# Patient Record
Sex: MALE | Race: WHITE | NOT HISPANIC OR LATINO
[De-identification: names, ages, dates, MRNs, and addresses within clinical notes are randomized per-mention and may not be internally consistent; named-entity substitution may affect disease eponyms.]

---

## 2022-04-08 ENCOUNTER — APPOINTMENT (OUTPATIENT)
Dept: ORTHOPEDIC SURGERY | Facility: CLINIC | Age: 82
End: 2022-04-08
Payer: MEDICARE

## 2022-04-08 PROCEDURE — 99204 OFFICE O/P NEW MOD 45 MIN: CPT

## 2022-04-08 NOTE — HISTORY OF PRESENT ILLNESS
[1] : 2 [Constant] : constant [Meds] : meds [Heat] : heat [Walking] : walking [Retired] : Work status: retired [de-identified] : Pt reports pain and weakness in left thigh- worse first thing in the morning, x~2 weeks with NKI\par Reports pain improves throughout the day\par Pt had Xrays at [facility] per PCP Dr Preciado \par Pt has rx for PT- states he has not started yet  [] : no [FreeTextEntry9] : Tylenol, Eliecer Pas

## 2022-04-08 NOTE — PHYSICAL EXAM
[Outside films reviewed] : Outside films reviewed [] : clonus not sustained at ankle [TWNoteComboBox7] : forward flexion 60 degrees [de-identified] : extension 10 degrees [FreeTextEntry1] : multilevel lumbar spondylosis, scoliosis, Spondylolisthesis L4-5

## 2022-04-08 NOTE — ASSESSMENT
[FreeTextEntry1] : Recommend: - NSAID - Heating pad - Muscle relaxer - Core strengthening exercise - Hamstring stretching exercise Patient is given back rehabilitation exercise book. \par \par Begin PT\par \par Follow up in 2 months

## 2022-06-23 ENCOUNTER — APPOINTMENT (OUTPATIENT)
Dept: ORTHOPEDIC SURGERY | Facility: CLINIC | Age: 82
End: 2022-06-23
Payer: MEDICARE

## 2022-06-23 VITALS — WEIGHT: 236 LBS | BODY MASS INDEX: 33.79 KG/M2 | HEIGHT: 70 IN

## 2022-06-23 DIAGNOSIS — M54.50 LOW BACK PAIN, UNSPECIFIED: ICD-10-CM

## 2022-06-23 DIAGNOSIS — M54.31 SCIATICA, RIGHT SIDE: ICD-10-CM

## 2022-06-23 PROCEDURE — 99214 OFFICE O/P EST MOD 30 MIN: CPT

## 2022-06-23 RX ORDER — METRONIDAZOLE 250 MG/1
250 TABLET ORAL
Qty: 10 | Refills: 0 | Status: ACTIVE | COMMUNITY
Start: 2022-03-18

## 2022-06-23 RX ORDER — TORSEMIDE 20 MG/1
20 TABLET ORAL
Qty: 270 | Refills: 0 | Status: ACTIVE | COMMUNITY
Start: 2022-02-25

## 2022-06-23 RX ORDER — CLOPIDOGREL BISULFATE 75 MG/1
75 TABLET, FILM COATED ORAL
Qty: 90 | Refills: 0 | Status: ACTIVE | COMMUNITY
Start: 2022-02-03

## 2022-06-23 RX ORDER — POTASSIUM CHLORIDE 1500 MG/1
20 TABLET, EXTENDED RELEASE ORAL
Qty: 270 | Refills: 0 | Status: ACTIVE | COMMUNITY
Start: 2022-06-14

## 2022-06-23 RX ORDER — CAMPHOR, MENTHOL, METHYL SALICYLATE 86.24; 166.92; 278.2 MG/1; MG/1; MG/1
3.1-6-1 PATCH PERCUTANEOUS; TOPICAL; TRANSDERMAL
Qty: 12 | Refills: 0 | Status: ACTIVE | COMMUNITY
Start: 2022-04-13

## 2022-06-23 RX ORDER — POLYETHYLENE GLYCOL 3350 17 G/17G
17 POWDER, FOR SOLUTION ORAL
Qty: 510 | Refills: 0 | Status: ACTIVE | COMMUNITY
Start: 2022-02-24

## 2022-06-23 RX ORDER — METOPROLOL TARTRATE 25 MG/1
25 TABLET, FILM COATED ORAL
Qty: 180 | Refills: 0 | Status: ACTIVE | COMMUNITY
Start: 2021-09-14

## 2022-06-23 RX ORDER — ATORVASTATIN CALCIUM 20 MG/1
20 TABLET, FILM COATED ORAL
Qty: 90 | Refills: 0 | Status: ACTIVE | COMMUNITY
Start: 2022-05-16

## 2022-06-23 RX ORDER — BRIMONIDINE TARTRATE, TIMOLOL MALEATE 2; 5 MG/ML; MG/ML
0.2-0.5 SOLUTION/ DROPS OPHTHALMIC
Qty: 15 | Refills: 0 | Status: ACTIVE | COMMUNITY
Start: 2021-12-28

## 2022-06-23 RX ORDER — APIXABAN 2.5 MG/1
2.5 TABLET, FILM COATED ORAL
Qty: 180 | Refills: 0 | Status: ACTIVE | COMMUNITY
Start: 2022-05-24

## 2022-06-23 NOTE — PHYSICAL EXAM
[Outside films reviewed] : Outside films reviewed [FreeTextEntry1] : multilevel lumbar spondylosis, scoliosis, Spondylolisthesis L4-5  [] : clonus not sustained at ankle [TWNoteComboBox7] : forward flexion 60 degrees [de-identified] : extension 10 degrees

## 2022-06-23 NOTE — HISTORY OF PRESENT ILLNESS
[1] : 2 [Constant] : constant [Meds] : meds [Heat] : heat [Walking] : walking [Retired] : Work status: retired [de-identified] : Follow up lumbar spine. Experiences an ache when walking to dining room. Stopped PT last week. Denies pain meds. [] : no [FreeTextEntry9] : Tylenol, Eliecer Pas

## 2022-06-23 NOTE — ASSESSMENT
[FreeTextEntry1] : Patient given prescription for MRI, follow up after study is completed to discuss results. \par \par Recommend: - NSAID - Heating pad - Muscle relaxer - Core strengthening exercise - Hamstring stretching exercise Patient is given back rehabilitation exercise book.

## 2022-07-07 ENCOUNTER — RESULT REVIEW (OUTPATIENT)
Age: 82
End: 2022-07-07

## 2022-08-04 ENCOUNTER — APPOINTMENT (OUTPATIENT)
Dept: ORTHOPEDIC SURGERY | Facility: CLINIC | Age: 82
End: 2022-08-04

## 2022-08-04 VITALS — BODY MASS INDEX: 33.79 KG/M2 | HEIGHT: 70 IN | WEIGHT: 236 LBS

## 2022-08-04 DIAGNOSIS — M51.36 OTHER INTERVERTEBRAL DISC DEGENERATION, LUMBAR REGION: ICD-10-CM

## 2022-08-04 DIAGNOSIS — M51.26 OTHER INTERVERTEBRAL DISC DISPLACEMENT, LUMBAR REGION: ICD-10-CM

## 2022-08-04 DIAGNOSIS — M43.16 SPONDYLOLISTHESIS, LUMBAR REGION: ICD-10-CM

## 2022-08-04 DIAGNOSIS — M47.817 SPONDYLOSIS W/OUT MYELOPATHY OR RADICULOPATHY, LUMBOSACRAL REGION: ICD-10-CM

## 2022-08-04 DIAGNOSIS — M48.061 SPINAL STENOSIS, LUMBAR REGION WITHOUT NEUROGENIC CLAUDICATION: ICD-10-CM

## 2022-08-04 DIAGNOSIS — M51.37 OTHER INTERVERTEBRAL DISC DEGENERATION, LUMBOSACRAL REGION: ICD-10-CM

## 2022-08-04 PROCEDURE — 99214 OFFICE O/P EST MOD 30 MIN: CPT

## 2022-08-04 NOTE — HISTORY OF PRESENT ILLNESS
[1] : 2 [Constant] : constant [Meds] : meds [Heat] : heat [Walking] : walking [Retired] : Work status: retired [de-identified] : Follow up lumbar spine MRI Results at . Patient has been resting, not moving around much and has been feeling some relief. Denies pain meds. [] : no [FreeTextEntry9] : Tylenol, Eliecer Pas

## 2022-08-04 NOTE — DATA REVIEWED
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

## 2022-08-04 NOTE — PHYSICAL EXAM
[] : clonus not sustained at ankle [TWNoteComboBox7] : forward flexion 60 degrees [de-identified] : extension 10 degrees

## 2022-08-04 NOTE — ASSESSMENT
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 \par \par Referral to pain management for injections, follow up 2 weeks after injection. \par \par Continue PT

## 2024-01-01 ENCOUNTER — INPATIENT (INPATIENT)
Facility: HOSPITAL | Age: 84
LOS: 0 days | DRG: 270 | End: 2024-07-04
Attending: HOSPITALIST | Admitting: HOSPITALIST
Payer: MEDICARE

## 2024-01-01 VITALS
HEIGHT: 72 IN | RESPIRATION RATE: 28 BRPM | SYSTOLIC BLOOD PRESSURE: 128 MMHG | HEART RATE: 114 BPM | DIASTOLIC BLOOD PRESSURE: 73 MMHG | OXYGEN SATURATION: 100 % | WEIGHT: 218.04 LBS

## 2024-01-01 VITALS — HEART RATE: 20 BPM

## 2024-01-01 DIAGNOSIS — I10 ESSENTIAL (PRIMARY) HYPERTENSION: ICD-10-CM

## 2024-01-01 DIAGNOSIS — I21.3 ST ELEVATION (STEMI) MYOCARDIAL INFARCTION OF UNSPECIFIED SITE: ICD-10-CM

## 2024-01-01 DIAGNOSIS — R57.0 CARDIOGENIC SHOCK: ICD-10-CM

## 2024-01-01 DIAGNOSIS — E78.5 HYPERLIPIDEMIA, UNSPECIFIED: ICD-10-CM

## 2024-01-01 DIAGNOSIS — I50.9 HEART FAILURE, UNSPECIFIED: ICD-10-CM

## 2024-01-01 LAB
ALBUMIN SERPL ELPH-MCNC: 3 G/DL — LOW (ref 3.3–5.2)
ALBUMIN SERPL ELPH-MCNC: 3.1 G/DL — LOW (ref 3.3–5.2)
ALBUMIN SERPL ELPH-MCNC: 3.2 G/DL — LOW (ref 3.3–5.2)
ALP SERPL-CCNC: 159 U/L — HIGH (ref 40–120)
ALP SERPL-CCNC: 162 U/L — HIGH (ref 40–120)
ALP SERPL-CCNC: 179 U/L — HIGH (ref 40–120)
ALP SERPL-CCNC: 182 U/L — HIGH (ref 40–120)
ALP SERPL-CCNC: 185 U/L — HIGH (ref 40–120)
ALT FLD-CCNC: 1521 U/L — HIGH
ALT FLD-CCNC: 2232 U/L — HIGH
ALT FLD-CCNC: 2972 U/L — HIGH
ALT FLD-CCNC: 3318 U/L — HIGH
ALT FLD-CCNC: 3335 U/L — HIGH
AMMONIA BLD-MCNC: 43 UMOL/L — SIGNIFICANT CHANGE UP (ref 11–55)
ANION GAP SERPL CALC-SCNC: 23 MMOL/L — HIGH (ref 5–17)
ANION GAP SERPL CALC-SCNC: 26 MMOL/L — HIGH (ref 5–17)
ANION GAP SERPL CALC-SCNC: 30 MMOL/L — HIGH (ref 5–17)
ANION GAP SERPL CALC-SCNC: 42 MMOL/L — HIGH (ref 5–17)
ANION GAP SERPL CALC-SCNC: 43 MMOL/L — HIGH (ref 5–17)
APPEARANCE UR: CLEAR — SIGNIFICANT CHANGE UP
APTT BLD: 31.4 SEC — SIGNIFICANT CHANGE UP (ref 24.5–35.6)
APTT BLD: 76.2 SEC — HIGH (ref 24.5–35.6)
AST SERPL-CCNC: 1713 U/L — HIGH
AST SERPL-CCNC: 2637 U/L — HIGH
AST SERPL-CCNC: 3978 U/L — HIGH
AST SERPL-CCNC: 4385 U/L — HIGH
AST SERPL-CCNC: 4414 U/L — HIGH
BASE EXCESS BLDA CALC-SCNC: -3.3 MMOL/L — LOW (ref -2–3)
BASE EXCESS BLDV CALC-SCNC: -3.2 MMOL/L — LOW (ref -2–3)
BASOPHILS # BLD AUTO: 0 K/UL — SIGNIFICANT CHANGE UP (ref 0–0.2)
BASOPHILS # BLD AUTO: 0.12 K/UL — SIGNIFICANT CHANGE UP (ref 0–0.2)
BASOPHILS NFR BLD AUTO: 0 % — SIGNIFICANT CHANGE UP (ref 0–2)
BASOPHILS NFR BLD AUTO: 0.3 % — SIGNIFICANT CHANGE UP (ref 0–2)
BILIRUB SERPL-MCNC: 1.9 MG/DL — SIGNIFICANT CHANGE UP (ref 0.4–2)
BILIRUB SERPL-MCNC: 2.3 MG/DL — HIGH (ref 0.4–2)
BILIRUB SERPL-MCNC: 2.3 MG/DL — HIGH (ref 0.4–2)
BILIRUB SERPL-MCNC: 2.6 MG/DL — HIGH (ref 0.4–2)
BILIRUB SERPL-MCNC: 2.8 MG/DL — HIGH (ref 0.4–2)
BILIRUB UR-MCNC: NEGATIVE — SIGNIFICANT CHANGE UP
BLOOD GAS COMMENTS ARTERIAL: SIGNIFICANT CHANGE UP
BUN SERPL-MCNC: 52.9 MG/DL — HIGH (ref 8–20)
BUN SERPL-MCNC: 58.8 MG/DL — HIGH (ref 8–20)
BUN SERPL-MCNC: 72 MG/DL — HIGH (ref 8–20)
BUN SERPL-MCNC: 76.1 MG/DL — HIGH (ref 8–20)
BUN SERPL-MCNC: 80.1 MG/DL — HIGH (ref 8–20)
CA-I SERPL-SCNC: 0.98 MMOL/L — LOW (ref 1.15–1.33)
CALCIUM SERPL-MCNC: 7.7 MG/DL — LOW (ref 8.4–10.5)
CALCIUM SERPL-MCNC: 7.7 MG/DL — LOW (ref 8.4–10.5)
CALCIUM SERPL-MCNC: 7.9 MG/DL — LOW (ref 8.4–10.5)
CALCIUM SERPL-MCNC: 8 MG/DL — LOW (ref 8.4–10.5)
CALCIUM SERPL-MCNC: 8.3 MG/DL — LOW (ref 8.4–10.5)
CHLORIDE BLDV-SCNC: 102 MMOL/L — SIGNIFICANT CHANGE UP (ref 96–108)
CHLORIDE SERPL-SCNC: 86 MMOL/L — LOW (ref 96–108)
CHLORIDE SERPL-SCNC: 87 MMOL/L — LOW (ref 96–108)
CHLORIDE SERPL-SCNC: 93 MMOL/L — LOW (ref 96–108)
CHLORIDE SERPL-SCNC: 96 MMOL/L — SIGNIFICANT CHANGE UP (ref 96–108)
CHLORIDE SERPL-SCNC: 97 MMOL/L — SIGNIFICANT CHANGE UP (ref 96–108)
CK MB CFR SERPL CALC: 112.9 NG/ML — HIGH (ref 0–6.7)
CK SERPL-CCNC: 2096 U/L — HIGH (ref 30–200)
CK SERPL-CCNC: 853 U/L — HIGH (ref 30–200)
CO2 SERPL-SCNC: 13 MMOL/L — LOW (ref 22–29)
CO2 SERPL-SCNC: 14 MMOL/L — LOW (ref 22–29)
CO2 SERPL-SCNC: 18 MMOL/L — LOW (ref 22–29)
CO2 SERPL-SCNC: 18 MMOL/L — LOW (ref 22–29)
CO2 SERPL-SCNC: 19 MMOL/L — LOW (ref 22–29)
COLOR SPEC: YELLOW — SIGNIFICANT CHANGE UP
CREAT SERPL-MCNC: 3.5 MG/DL — HIGH (ref 0.5–1.3)
CREAT SERPL-MCNC: 4.1 MG/DL — HIGH (ref 0.5–1.3)
CREAT SERPL-MCNC: 4.89 MG/DL — HIGH (ref 0.5–1.3)
CREAT SERPL-MCNC: 5.76 MG/DL — HIGH (ref 0.5–1.3)
CREAT SERPL-MCNC: 6.1 MG/DL — HIGH (ref 0.5–1.3)
DIFF PNL FLD: ABNORMAL
EGFR: 11 ML/MIN/1.73M2 — LOW
EGFR: 14 ML/MIN/1.73M2 — LOW
EGFR: 17 ML/MIN/1.73M2 — LOW
EGFR: 9 ML/MIN/1.73M2 — LOW
EGFR: 9 ML/MIN/1.73M2 — LOW
EOSINOPHIL # BLD AUTO: 0 K/UL — SIGNIFICANT CHANGE UP (ref 0–0.5)
EOSINOPHIL # BLD AUTO: 0 K/UL — SIGNIFICANT CHANGE UP (ref 0–0.5)
EOSINOPHIL NFR BLD AUTO: 0 % — SIGNIFICANT CHANGE UP (ref 0–6)
EOSINOPHIL NFR BLD AUTO: 0 % — SIGNIFICANT CHANGE UP (ref 0–6)
FIBRINOGEN PPP-MCNC: 610 MG/DL — HIGH (ref 200–450)
FIBRINOGEN PPP-MCNC: 684 MG/DL — HIGH (ref 200–450)
GAS PNL BLDA: SIGNIFICANT CHANGE UP
GAS PNL BLDV: 137 MMOL/L — SIGNIFICANT CHANGE UP (ref 136–145)
GAS PNL BLDV: SIGNIFICANT CHANGE UP
GLUCOSE BLDC GLUCOMTR-MCNC: 192 MG/DL — HIGH (ref 70–99)
GLUCOSE BLDV-MCNC: 90 MG/DL — SIGNIFICANT CHANGE UP (ref 70–99)
GLUCOSE SERPL-MCNC: 156 MG/DL — HIGH (ref 70–99)
GLUCOSE SERPL-MCNC: 189 MG/DL — HIGH (ref 70–99)
GLUCOSE SERPL-MCNC: 224 MG/DL — HIGH (ref 70–99)
GLUCOSE SERPL-MCNC: 91 MG/DL — SIGNIFICANT CHANGE UP (ref 70–99)
GLUCOSE SERPL-MCNC: 92 MG/DL — SIGNIFICANT CHANGE UP (ref 70–99)
GLUCOSE UR QL: 250 MG/DL
HCO3 BLDA-SCNC: 20 MMOL/L — LOW (ref 21–28)
HCO3 BLDV-SCNC: 22 MMOL/L — SIGNIFICANT CHANGE UP (ref 22–29)
HCT VFR BLD CALC: 24.5 % — LOW (ref 39–50)
HCT VFR BLD CALC: 24.5 % — LOW (ref 39–50)
HCT VFR BLD CALC: 24.8 % — LOW (ref 39–50)
HCT VFR BLD CALC: 25 % — LOW (ref 39–50)
HCT VFR BLD CALC: 25.3 % — LOW (ref 39–50)
HCT VFR BLDA CALC: 26 % — SIGNIFICANT CHANGE UP
HGB BLD CALC-MCNC: 8.5 G/DL — LOW (ref 12.6–17.4)
HGB BLD-MCNC: 8.1 G/DL — LOW (ref 13–17)
HGB BLD-MCNC: 8.2 G/DL — LOW (ref 13–17)
HGB BLD-MCNC: 8.2 G/DL — LOW (ref 13–17)
HGB BLD-MCNC: 8.4 G/DL — LOW (ref 13–17)
HGB BLD-MCNC: 8.5 G/DL — LOW (ref 13–17)
HOROWITZ INDEX BLDA+IHG-RTO: 0.7 — SIGNIFICANT CHANGE UP
IMM GRANULOCYTES NFR BLD AUTO: 4.5 % — HIGH (ref 0–0.9)
INR BLD: 2.18 RATIO — HIGH (ref 0.85–1.18)
INR BLD: 2.48 RATIO — HIGH (ref 0.85–1.18)
INR BLD: 2.59 RATIO — HIGH (ref 0.85–1.18)
KETONES UR-MCNC: ABNORMAL MG/DL
LACTATE BLDV-MCNC: 9.6 MMOL/L — CRITICAL HIGH (ref 0.5–2)
LACTATE SERPL-SCNC: 11.4 MMOL/L — CRITICAL HIGH (ref 0.5–2)
LACTATE SERPL-SCNC: 19.5 MMOL/L — CRITICAL HIGH (ref 0.5–2)
LACTATE SERPL-SCNC: 21.4 MMOL/L — CRITICAL HIGH (ref 0.5–2)
LACTATE SERPL-SCNC: 8.5 MMOL/L — CRITICAL HIGH (ref 0.5–2)
LACTATE SERPL-SCNC: 9.2 MMOL/L — CRITICAL HIGH (ref 0.5–2)
LDH SERPL L TO P-CCNC: >2332 U/L — HIGH (ref 98–192)
LEUKOCYTE ESTERASE UR-ACNC: NEGATIVE — SIGNIFICANT CHANGE UP
LYMPHOCYTES # BLD AUTO: 1.88 K/UL — SIGNIFICANT CHANGE UP (ref 1–3.3)
LYMPHOCYTES # BLD AUTO: 2.15 K/UL — SIGNIFICANT CHANGE UP (ref 1–3.3)
LYMPHOCYTES # BLD AUTO: 5.4 % — LOW (ref 13–44)
LYMPHOCYTES # BLD AUTO: 7 % — LOW (ref 13–44)
MAGNESIUM SERPL-MCNC: 2.7 MG/DL — HIGH (ref 1.6–2.6)
MAGNESIUM SERPL-MCNC: 2.8 MG/DL — HIGH (ref 1.6–2.6)
MAGNESIUM SERPL-MCNC: 3 MG/DL — HIGH (ref 1.6–2.6)
MAGNESIUM SERPL-MCNC: 3.4 MG/DL — HIGH (ref 1.8–2.6)
MAGNESIUM SERPL-MCNC: 3.7 MG/DL — HIGH (ref 1.8–2.6)
MCHC RBC-ENTMCNC: 32 GM/DL — SIGNIFICANT CHANGE UP (ref 32–36)
MCHC RBC-ENTMCNC: 33.5 GM/DL — SIGNIFICANT CHANGE UP (ref 32–36)
MCHC RBC-ENTMCNC: 33.5 GM/DL — SIGNIFICANT CHANGE UP (ref 32–36)
MCHC RBC-ENTMCNC: 33.9 GM/DL — SIGNIFICANT CHANGE UP (ref 32–36)
MCHC RBC-ENTMCNC: 34 GM/DL — SIGNIFICANT CHANGE UP (ref 32–36)
MCHC RBC-ENTMCNC: 34.6 PG — HIGH (ref 27–34)
MCHC RBC-ENTMCNC: 34.7 PG — HIGH (ref 27–34)
MCHC RBC-ENTMCNC: 35 PG — HIGH (ref 27–34)
MCHC RBC-ENTMCNC: 35.1 PG — HIGH (ref 27–34)
MCHC RBC-ENTMCNC: 35.7 PG — HIGH (ref 27–34)
MCV RBC AUTO: 102.9 FL — HIGH (ref 80–100)
MCV RBC AUTO: 103.8 FL — HIGH (ref 80–100)
MCV RBC AUTO: 103.8 FL — HIGH (ref 80–100)
MCV RBC AUTO: 106.5 FL — HIGH (ref 80–100)
MCV RBC AUTO: 108.1 FL — HIGH (ref 80–100)
MONOCYTES # BLD AUTO: 1.87 K/UL — HIGH (ref 0–0.9)
MONOCYTES # BLD AUTO: 2.35 K/UL — HIGH (ref 0–0.9)
MONOCYTES NFR BLD AUTO: 6.1 % — SIGNIFICANT CHANGE UP (ref 2–14)
MONOCYTES NFR BLD AUTO: 6.8 % — SIGNIFICANT CHANGE UP (ref 2–14)
MRSA PCR RESULT.: SIGNIFICANT CHANGE UP
NEUTROPHILS # BLD AUTO: 25.85 K/UL — HIGH (ref 1.8–7.4)
NEUTROPHILS # BLD AUTO: 28.68 K/UL — HIGH (ref 1.8–7.4)
NEUTROPHILS NFR BLD AUTO: 83 % — HIGH (ref 43–77)
NEUTROPHILS NFR BLD AUTO: 84.3 % — HIGH (ref 43–77)
NITRITE UR-MCNC: NEGATIVE — SIGNIFICANT CHANGE UP
NRBC # BLD: 2 /100 WBCS — HIGH (ref 0–0)
NRBC # BLD: 2 /100 WBCS — HIGH (ref 0–0)
NRBC # BLD: 3 /100 WBCS — HIGH (ref 0–0)
NT-PROBNP SERPL-SCNC: HIGH PG/ML (ref 0–300)
PCO2 BLDA: 28 MMHG — LOW (ref 35–48)
PCO2 BLDV: 37 MMHG — LOW (ref 42–55)
PH BLDA: 7.47 — HIGH (ref 7.35–7.45)
PH BLDV: 7.38 — SIGNIFICANT CHANGE UP (ref 7.32–7.43)
PH UR: 6 — SIGNIFICANT CHANGE UP (ref 5–8)
PHOSPHATE SERPL-MCNC: 5.2 MG/DL — HIGH (ref 2.4–4.7)
PHOSPHATE SERPL-MCNC: 5.3 MG/DL — HIGH (ref 2.4–4.7)
PHOSPHATE SERPL-MCNC: 6 MG/DL — HIGH (ref 2.4–4.7)
PHOSPHATE SERPL-MCNC: 8.1 MG/DL — HIGH (ref 2.4–4.7)
PHOSPHATE SERPL-MCNC: 9.3 MG/DL — HIGH (ref 2.4–4.7)
PLATELET # BLD AUTO: 136 K/UL — LOW (ref 150–400)
PLATELET # BLD AUTO: 139 K/UL — LOW (ref 150–400)
PLATELET # BLD AUTO: 140 K/UL — LOW (ref 150–400)
PLATELET # BLD AUTO: 145 K/UL — LOW (ref 150–400)
PLATELET # BLD AUTO: 145 K/UL — LOW (ref 150–400)
PO2 BLDA: 233 MMHG — HIGH (ref 83–108)
PO2 BLDV: 48 MMHG — HIGH (ref 25–45)
POTASSIUM BLDV-SCNC: 5 MMOL/L — SIGNIFICANT CHANGE UP (ref 3.5–5.1)
POTASSIUM SERPL-MCNC: 3.6 MMOL/L — SIGNIFICANT CHANGE UP (ref 3.5–5.3)
POTASSIUM SERPL-MCNC: 4.1 MMOL/L — SIGNIFICANT CHANGE UP (ref 3.5–5.3)
POTASSIUM SERPL-MCNC: 4.1 MMOL/L — SIGNIFICANT CHANGE UP (ref 3.5–5.3)
POTASSIUM SERPL-MCNC: 4.4 MMOL/L — SIGNIFICANT CHANGE UP (ref 3.5–5.3)
POTASSIUM SERPL-MCNC: 4.7 MMOL/L — SIGNIFICANT CHANGE UP (ref 3.5–5.3)
POTASSIUM SERPL-SCNC: 3.6 MMOL/L — SIGNIFICANT CHANGE UP (ref 3.5–5.3)
POTASSIUM SERPL-SCNC: 4.1 MMOL/L — SIGNIFICANT CHANGE UP (ref 3.5–5.3)
POTASSIUM SERPL-SCNC: 4.1 MMOL/L — SIGNIFICANT CHANGE UP (ref 3.5–5.3)
POTASSIUM SERPL-SCNC: 4.4 MMOL/L — SIGNIFICANT CHANGE UP (ref 3.5–5.3)
POTASSIUM SERPL-SCNC: 4.7 MMOL/L — SIGNIFICANT CHANGE UP (ref 3.5–5.3)
PROT SERPL-MCNC: 5.7 G/DL — LOW (ref 6.6–8.7)
PROT SERPL-MCNC: 5.8 G/DL — LOW (ref 6.6–8.7)
PROT SERPL-MCNC: 6 G/DL — LOW (ref 6.6–8.7)
PROT UR-MCNC: 100 MG/DL
PROTHROM AB SERPL-ACNC: 23.7 SEC — HIGH (ref 9.5–13)
PROTHROM AB SERPL-ACNC: 26.8 SEC — HIGH (ref 9.5–13)
PROTHROM AB SERPL-ACNC: 28 SEC — HIGH (ref 9.5–13)
RBC # BLD: 2.3 M/UL — LOW (ref 4.2–5.8)
RBC # BLD: 2.34 M/UL — LOW (ref 4.2–5.8)
RBC # BLD: 2.36 M/UL — LOW (ref 4.2–5.8)
RBC # BLD: 2.39 M/UL — LOW (ref 4.2–5.8)
RBC # BLD: 2.43 M/UL — LOW (ref 4.2–5.8)
RBC # FLD: 17.8 % — HIGH (ref 10.3–14.5)
RBC # FLD: 18.2 % — HIGH (ref 10.3–14.5)
RBC # FLD: 18.3 % — HIGH (ref 10.3–14.5)
RBC # FLD: 18.3 % — HIGH (ref 10.3–14.5)
RBC # FLD: 18.4 % — HIGH (ref 10.3–14.5)
S AUREUS DNA NOSE QL NAA+PROBE: DETECTED
SAO2 % BLDA: 100 % — HIGH (ref 94–98)
SAO2 % BLDV: 69.5 % — SIGNIFICANT CHANGE UP
SODIUM SERPL-SCNC: 138 MMOL/L — SIGNIFICANT CHANGE UP (ref 135–145)
SODIUM SERPL-SCNC: 140 MMOL/L — SIGNIFICANT CHANGE UP (ref 135–145)
SODIUM SERPL-SCNC: 141 MMOL/L — SIGNIFICANT CHANGE UP (ref 135–145)
SODIUM SERPL-SCNC: 141 MMOL/L — SIGNIFICANT CHANGE UP (ref 135–145)
SODIUM SERPL-SCNC: 144 MMOL/L — SIGNIFICANT CHANGE UP (ref 135–145)
SP GR SPEC: 1.02 — SIGNIFICANT CHANGE UP (ref 1–1.03)
TROPONIN T, HIGH SENSITIVITY RESULT: 2964 NG/L — HIGH (ref 0–51)
TROPONIN T, HIGH SENSITIVITY RESULT: 3030 NG/L — HIGH (ref 0–51)
TROPONIN T, HIGH SENSITIVITY RESULT: 7561 NG/L — HIGH (ref 0–51)
UROBILINOGEN FLD QL: 1 MG/DL — SIGNIFICANT CHANGE UP (ref 0.2–1)
WBC # BLD: 25.61 K/UL — HIGH (ref 3.8–10.5)
WBC # BLD: 27.71 K/UL — HIGH (ref 3.8–10.5)
WBC # BLD: 30.66 K/UL — HIGH (ref 3.8–10.5)
WBC # BLD: 30.67 K/UL — HIGH (ref 3.8–10.5)
WBC # BLD: 34.57 K/UL — HIGH (ref 3.8–10.5)
WBC # FLD AUTO: 25.61 K/UL — HIGH (ref 3.8–10.5)
WBC # FLD AUTO: 27.71 K/UL — HIGH (ref 3.8–10.5)
WBC # FLD AUTO: 30.66 K/UL — HIGH (ref 3.8–10.5)
WBC # FLD AUTO: 30.67 K/UL — HIGH (ref 3.8–10.5)
WBC # FLD AUTO: 34.57 K/UL — HIGH (ref 3.8–10.5)

## 2024-01-01 PROCEDURE — 81001 URINALYSIS AUTO W/SCOPE: CPT

## 2024-01-01 PROCEDURE — 82248 BILIRUBIN DIRECT: CPT

## 2024-01-01 PROCEDURE — 99291 CRITICAL CARE FIRST HOUR: CPT | Mod: GC

## 2024-01-01 PROCEDURE — 80053 COMPREHEN METABOLIC PANEL: CPT

## 2024-01-01 PROCEDURE — 93306 TTE W/DOPPLER COMPLETE: CPT | Mod: 26

## 2024-01-01 PROCEDURE — 82550 ASSAY OF CK (CPK): CPT

## 2024-01-01 PROCEDURE — 84132 ASSAY OF SERUM POTASSIUM: CPT

## 2024-01-01 PROCEDURE — 85610 PROTHROMBIN TIME: CPT

## 2024-01-01 PROCEDURE — 82140 ASSAY OF AMMONIA: CPT

## 2024-01-01 PROCEDURE — 94002 VENT MGMT INPAT INIT DAY: CPT

## 2024-01-01 PROCEDURE — 95700 EEG CONT REC W/VID EEG TECH: CPT

## 2024-01-01 PROCEDURE — 84484 ASSAY OF TROPONIN QUANT: CPT

## 2024-01-01 PROCEDURE — 36415 COLL VENOUS BLD VENIPUNCTURE: CPT

## 2024-01-01 PROCEDURE — 83010 ASSAY OF HAPTOGLOBIN QUANT: CPT

## 2024-01-01 PROCEDURE — 99291 CRITICAL CARE FIRST HOUR: CPT

## 2024-01-01 PROCEDURE — 94003 VENT MGMT INPAT SUBQ DAY: CPT

## 2024-01-01 PROCEDURE — 85384 FIBRINOGEN ACTIVITY: CPT

## 2024-01-01 PROCEDURE — 93005 ELECTROCARDIOGRAM TRACING: CPT

## 2024-01-01 PROCEDURE — 83605 ASSAY OF LACTIC ACID: CPT

## 2024-01-01 PROCEDURE — 83615 LACTATE (LD) (LDH) ENZYME: CPT

## 2024-01-01 PROCEDURE — 87040 BLOOD CULTURE FOR BACTERIA: CPT

## 2024-01-01 PROCEDURE — 84295 ASSAY OF SERUM SODIUM: CPT

## 2024-01-01 PROCEDURE — 71045 X-RAY EXAM CHEST 1 VIEW: CPT | Mod: 26

## 2024-01-01 PROCEDURE — 86900 BLOOD TYPING SEROLOGIC ABO: CPT

## 2024-01-01 PROCEDURE — C8929: CPT

## 2024-01-01 PROCEDURE — 82553 CREATINE MB FRACTION: CPT

## 2024-01-01 PROCEDURE — 85027 COMPLETE CBC AUTOMATED: CPT

## 2024-01-01 PROCEDURE — 71045 X-RAY EXAM CHEST 1 VIEW: CPT

## 2024-01-01 PROCEDURE — 85025 COMPLETE CBC W/AUTO DIFF WBC: CPT

## 2024-01-01 PROCEDURE — 82947 ASSAY GLUCOSE BLOOD QUANT: CPT

## 2024-01-01 PROCEDURE — 83735 ASSAY OF MAGNESIUM: CPT

## 2024-01-01 PROCEDURE — 86901 BLOOD TYPING SEROLOGIC RH(D): CPT

## 2024-01-01 PROCEDURE — 85018 HEMOGLOBIN: CPT

## 2024-01-01 PROCEDURE — 93010 ELECTROCARDIOGRAM REPORT: CPT

## 2024-01-01 PROCEDURE — 82803 BLOOD GASES ANY COMBINATION: CPT

## 2024-01-01 PROCEDURE — 87641 MR-STAPH DNA AMP PROBE: CPT

## 2024-01-01 PROCEDURE — 85014 HEMATOCRIT: CPT

## 2024-01-01 PROCEDURE — 83880 ASSAY OF NATRIURETIC PEPTIDE: CPT

## 2024-01-01 PROCEDURE — 82962 GLUCOSE BLOOD TEST: CPT

## 2024-01-01 PROCEDURE — 82330 ASSAY OF CALCIUM: CPT

## 2024-01-01 PROCEDURE — 95714 VEEG EA 12-26 HR UNMNTR: CPT

## 2024-01-01 PROCEDURE — 99222 1ST HOSP IP/OBS MODERATE 55: CPT

## 2024-01-01 PROCEDURE — 82435 ASSAY OF BLOOD CHLORIDE: CPT

## 2024-01-01 PROCEDURE — 87640 STAPH A DNA AMP PROBE: CPT

## 2024-01-01 PROCEDURE — 84100 ASSAY OF PHOSPHORUS: CPT

## 2024-01-01 PROCEDURE — 71045 X-RAY EXAM CHEST 1 VIEW: CPT | Mod: 26,76

## 2024-01-01 PROCEDURE — 85730 THROMBOPLASTIN TIME PARTIAL: CPT

## 2024-01-01 PROCEDURE — 86850 RBC ANTIBODY SCREEN: CPT

## 2024-01-01 RX ORDER — PIPERACILLIN SODIUM AND TAZOBACTAM SODIUM 3; .375 G/15ML; G/15ML
3.38 INJECTION, POWDER, LYOPHILIZED, FOR SOLUTION INTRAVENOUS EVERY 8 HOURS
Refills: 0 | Status: DISCONTINUED | OUTPATIENT
Start: 2024-01-01 | End: 2024-01-01

## 2024-01-01 RX ORDER — VANCOMYCIN HYDROCHLORIDE 50 MG/ML
1000 KIT ORAL ONCE
Refills: 0 | Status: COMPLETED | OUTPATIENT
Start: 2024-01-01 | End: 2024-01-01

## 2024-01-01 RX ORDER — AMIODARONE HYDROCHLORIDE 50 MG/ML
150 INJECTION, SOLUTION INTRAVENOUS ONCE
Refills: 0 | Status: COMPLETED | OUTPATIENT
Start: 2024-01-01 | End: 2024-01-01

## 2024-01-01 RX ORDER — NOREPINEPHRINE BITARTRATE 1 MG/ML
0.52 INJECTION INTRAVENOUS
Qty: 8 | Refills: 0 | Status: DISCONTINUED | OUTPATIENT
Start: 2024-01-01 | End: 2024-01-01

## 2024-01-01 RX ORDER — FENTANYL CITRATE 50 UG/ML
0.99 INJECTION, SOLUTION INTRAMUSCULAR; INTRAVENOUS
Qty: 2500 | Refills: 0 | Status: DISCONTINUED | OUTPATIENT
Start: 2024-01-01 | End: 2024-01-01

## 2024-01-01 RX ORDER — VASOPRESSIN 20 UNIT/ML
0.04 VIAL (ML) INJECTION
Qty: 40 | Refills: 0 | Status: DISCONTINUED | OUTPATIENT
Start: 2024-01-01 | End: 2024-01-01

## 2024-01-01 RX ORDER — DEXTROSE MONOHYDRATE AND SODIUM CHLORIDE 5; .3 G/100ML; G/100ML
1000 INJECTION, SOLUTION INTRAVENOUS
Refills: 0 | Status: DISCONTINUED | OUTPATIENT
Start: 2024-01-01 | End: 2024-01-01

## 2024-01-01 RX ORDER — HEPARIN SODIUM 50 [USP'U]/ML
INJECTION, SOLUTION INTRAVENOUS
Qty: 25000 | Refills: 0 | Status: DISCONTINUED | OUTPATIENT
Start: 2024-01-01 | End: 2024-01-01

## 2024-01-01 RX ORDER — LIDOCAINE HYDROCHLORIDE 20 MG/ML
1 INJECTION, SOLUTION EPIDURAL; INFILTRATION; INTRACAUDAL; PERINEURAL
Qty: 2 | Refills: 0 | Status: DISCONTINUED | OUTPATIENT
Start: 2024-01-01 | End: 2024-01-01

## 2024-01-01 RX ORDER — DEXTROSE 30 % IN WATER 30 %
15 VIAL (ML) INTRAVENOUS ONCE
Refills: 0 | Status: DISCONTINUED | OUTPATIENT
Start: 2024-01-01 | End: 2024-01-01

## 2024-01-01 RX ORDER — ASPIRIN 325 MG/1
81 TABLET, FILM COATED ORAL DAILY
Refills: 0 | Status: DISCONTINUED | OUTPATIENT
Start: 2024-01-01 | End: 2024-01-01

## 2024-01-01 RX ORDER — GLUCAGON HYDROCHLORIDE 1 MG/ML
1 INJECTION, POWDER, FOR SOLUTION INTRAMUSCULAR; INTRAVENOUS; SUBCUTANEOUS ONCE
Refills: 0 | Status: DISCONTINUED | OUTPATIENT
Start: 2024-01-01 | End: 2024-01-01

## 2024-01-01 RX ORDER — PIPERACILLIN SODIUM AND TAZOBACTAM SODIUM 3; .375 G/15ML; G/15ML
3.38 INJECTION, POWDER, LYOPHILIZED, FOR SOLUTION INTRAVENOUS ONCE
Refills: 0 | Status: COMPLETED | OUTPATIENT
Start: 2024-01-01 | End: 2024-01-01

## 2024-01-01 RX ORDER — INSULIN LISPRO 100 [IU]/ML
INJECTION, SOLUTION SUBCUTANEOUS EVERY 6 HOURS
Refills: 0 | Status: DISCONTINUED | OUTPATIENT
Start: 2024-01-01 | End: 2024-01-01

## 2024-01-01 RX ORDER — POLYVINYL ALCOHOL, POVIDONE .5; .6 G/100ML; G/100ML
1 SOLUTION OPHTHALMIC EVERY 6 HOURS
Refills: 0 | Status: DISCONTINUED | OUTPATIENT
Start: 2024-01-01 | End: 2024-01-01

## 2024-01-01 RX ORDER — PANTOPRAZOLE SODIUM 40 MG/10ML
40 INJECTION, POWDER, FOR SOLUTION INTRAVENOUS DAILY
Refills: 0 | Status: DISCONTINUED | OUTPATIENT
Start: 2024-01-01 | End: 2024-01-01

## 2024-01-01 RX ORDER — DEXTROSE 30 % IN WATER 30 %
12.5 VIAL (ML) INTRAVENOUS ONCE
Refills: 0 | Status: DISCONTINUED | OUTPATIENT
Start: 2024-01-01 | End: 2024-01-01

## 2024-01-01 RX ORDER — NOREPINEPHRINE BITARTRATE 1 MG/ML
0.52 INJECTION INTRAVENOUS
Qty: 16 | Refills: 0 | Status: DISCONTINUED | OUTPATIENT
Start: 2024-01-01 | End: 2024-01-01

## 2024-01-01 RX ORDER — MIDAZOLAM HYDROCHLORIDE 1 MG/ML
0.02 INJECTION INTRAMUSCULAR; INTRAVENOUS
Qty: 100 | Refills: 0 | Status: DISCONTINUED | OUTPATIENT
Start: 2024-01-01 | End: 2024-01-01

## 2024-01-01 RX ORDER — SODIUM CHLORIDE 0.9 % (FLUSH) 0.9 %
10 SYRINGE (ML) INJECTION
Refills: 0 | Status: DISCONTINUED | OUTPATIENT
Start: 2024-01-01 | End: 2024-01-01

## 2024-01-01 RX ORDER — DEXTROSE MONOHYDRATE 100 MG/ML
125 INJECTION, SOLUTION INTRAVENOUS ONCE
Refills: 0 | Status: DISCONTINUED | OUTPATIENT
Start: 2024-01-01 | End: 2024-01-01

## 2024-01-01 RX ORDER — FENTANYL CITRATE 50 UG/ML
1 INJECTION, SOLUTION INTRAMUSCULAR; INTRAVENOUS
Qty: 2500 | Refills: 0 | Status: DISCONTINUED | OUTPATIENT
Start: 2024-01-01 | End: 2024-01-01

## 2024-01-01 RX ORDER — SODIUM BICARBONATE 650 MG/1
50 TABLET ORAL ONCE
Refills: 0 | Status: COMPLETED | OUTPATIENT
Start: 2024-01-01 | End: 2024-01-01

## 2024-01-01 RX ORDER — FENTANYL CITRATE 50 UG/ML
0.5 INJECTION, SOLUTION INTRAMUSCULAR; INTRAVENOUS
Qty: 2500 | Refills: 0 | Status: DISCONTINUED | OUTPATIENT
Start: 2024-01-01 | End: 2024-01-01

## 2024-01-01 RX ORDER — AMIODARONE HYDROCHLORIDE 50 MG/ML
1 INJECTION, SOLUTION INTRAVENOUS
Qty: 450 | Refills: 0 | Status: DISCONTINUED | OUTPATIENT
Start: 2024-01-01 | End: 2024-01-01

## 2024-01-01 RX ORDER — DEXTROSE 30 % IN WATER 30 %
25 VIAL (ML) INTRAVENOUS ONCE
Refills: 0 | Status: DISCONTINUED | OUTPATIENT
Start: 2024-01-01 | End: 2024-01-01

## 2024-01-01 RX ADMIN — LIDOCAINE HYDROCHLORIDE 7.5 MG/MIN: 20 INJECTION, SOLUTION EPIDURAL; INFILTRATION; INTRACAUDAL; PERINEURAL at 14:38

## 2024-01-01 RX ADMIN — AMIODARONE HYDROCHLORIDE 618 MILLIGRAM(S): 50 INJECTION, SOLUTION INTRAVENOUS at 14:43

## 2024-01-01 RX ADMIN — PIPERACILLIN SODIUM AND TAZOBACTAM SODIUM 25 GRAM(S): 3; .375 INJECTION, POWDER, LYOPHILIZED, FOR SOLUTION INTRAVENOUS at 14:43

## 2024-01-01 RX ADMIN — LIDOCAINE HYDROCHLORIDE 7.5 MG/MIN: 20 INJECTION, SOLUTION EPIDURAL; INFILTRATION; INTRACAUDAL; PERINEURAL at 19:51

## 2024-01-01 RX ADMIN — NOREPINEPHRINE BITARTRATE 95.4 MICROGRAM(S)/KG/MIN: 1 INJECTION INTRAVENOUS at 20:20

## 2024-01-01 RX ADMIN — Medication 1 APPLICATION(S): at 05:19

## 2024-01-01 RX ADMIN — NOREPINEPHRINE BITARTRATE 47.7 MICROGRAM(S)/KG/MIN: 1 INJECTION INTRAVENOUS at 04:36

## 2024-01-01 RX ADMIN — VANCOMYCIN HYDROCHLORIDE 250 MILLIGRAM(S): KIT at 01:16

## 2024-01-01 RX ADMIN — LIDOCAINE HYDROCHLORIDE 7.5 MG/MIN: 20 INJECTION, SOLUTION EPIDURAL; INFILTRATION; INTRACAUDAL; PERINEURAL at 00:36

## 2024-01-01 RX ADMIN — PIPERACILLIN SODIUM AND TAZOBACTAM SODIUM 200 GRAM(S): 3; .375 INJECTION, POWDER, LYOPHILIZED, FOR SOLUTION INTRAVENOUS at 01:16

## 2024-01-01 RX ADMIN — NOREPINEPHRINE BITARTRATE 47.7 MICROGRAM(S)/KG/MIN: 1 INJECTION INTRAVENOUS at 14:39

## 2024-01-01 RX ADMIN — INSULIN LISPRO 2: 100 INJECTION, SOLUTION SUBCUTANEOUS at 05:25

## 2024-01-01 RX ADMIN — AMIODARONE HYDROCHLORIDE 33.3 MG/MIN: 50 INJECTION, SOLUTION INTRAVENOUS at 14:38

## 2024-01-01 RX ADMIN — ASPIRIN 81 MILLIGRAM(S): 325 TABLET, FILM COATED ORAL at 13:09

## 2024-01-01 RX ADMIN — Medication 6 UNIT(S)/MIN: at 14:40

## 2024-01-01 RX ADMIN — SODIUM BICARBONATE 50 MILLIEQUIVALENT(S): 650 TABLET ORAL at 22:27

## 2024-01-01 RX ADMIN — POLYVINYL ALCOHOL, POVIDONE 1 DROP(S): .5; .6 SOLUTION OPHTHALMIC at 01:15

## 2024-01-01 RX ADMIN — PIPERACILLIN SODIUM AND TAZOBACTAM SODIUM 25 GRAM(S): 3; .375 INJECTION, POWDER, LYOPHILIZED, FOR SOLUTION INTRAVENOUS at 09:54

## 2024-01-01 RX ADMIN — FENTANYL CITRATE 4.89 MICROGRAM(S)/KG/HR: 50 INJECTION, SOLUTION INTRAMUSCULAR; INTRAVENOUS at 19:51

## 2024-01-01 RX ADMIN — Medication 15 MILLILITER(S): at 05:19

## 2024-01-01 RX ADMIN — POLYVINYL ALCOHOL, POVIDONE 1 DROP(S): .5; .6 SOLUTION OPHTHALMIC at 05:22

## 2024-01-01 RX ADMIN — MIDAZOLAM HYDROCHLORIDE 1.98 MG/KG/HR: 1 INJECTION INTRAMUSCULAR; INTRAVENOUS at 14:39

## 2024-01-01 RX ADMIN — NOREPINEPHRINE BITARTRATE 47.7 MICROGRAM(S)/KG/MIN: 1 INJECTION INTRAVENOUS at 00:26

## 2024-01-01 RX ADMIN — POLYVINYL ALCOHOL, POVIDONE 1 DROP(S): .5; .6 SOLUTION OPHTHALMIC at 13:09

## 2024-01-01 RX ADMIN — FENTANYL CITRATE 9.78 MICROGRAM(S)/KG/HR: 50 INJECTION, SOLUTION INTRAMUSCULAR; INTRAVENOUS at 00:36

## 2024-01-01 RX ADMIN — FENTANYL CITRATE 9.78 MICROGRAM(S)/KG/HR: 50 INJECTION, SOLUTION INTRAMUSCULAR; INTRAVENOUS at 14:41

## 2024-01-01 RX ADMIN — Medication 6 UNIT(S)/MIN: at 21:59

## 2024-01-01 RX ADMIN — PANTOPRAZOLE SODIUM 40 MILLIGRAM(S): 40 INJECTION, POWDER, FOR SOLUTION INTRAVENOUS at 13:09

## 2024-01-01 RX ADMIN — HEPARIN SODIUM 1000 UNIT(S)/HR: 50 INJECTION, SOLUTION INTRAVENOUS at 14:39

## 2024-01-01 RX ADMIN — NOREPINEPHRINE BITARTRATE 47.7 MICROGRAM(S)/KG/MIN: 1 INJECTION INTRAVENOUS at 20:53

## 2024-01-01 RX ADMIN — Medication 6 UNIT(S)/MIN: at 00:36

## 2024-01-01 RX ADMIN — PIPERACILLIN SODIUM AND TAZOBACTAM SODIUM 3.38 GRAM(S): 3; .375 INJECTION, POWDER, LYOPHILIZED, FOR SOLUTION INTRAVENOUS at 13:00

## 2024-01-01 RX ADMIN — Medication 1 APPLICATION(S): at 05:21

## 2024-07-04 ENCOUNTER — RESULT REVIEW (OUTPATIENT)
Age: 84
End: 2024-07-04

## 2024-07-05 LAB — HAPTOGLOB SERPL-MCNC: <20 MG/DL — LOW (ref 34–200)

## 2024-07-09 LAB
CULTURE RESULTS: SIGNIFICANT CHANGE UP
CULTURE RESULTS: SIGNIFICANT CHANGE UP
SPECIMEN SOURCE: SIGNIFICANT CHANGE UP
SPECIMEN SOURCE: SIGNIFICANT CHANGE UP

## 2024-07-11 ENCOUNTER — APPOINTMENT (OUTPATIENT)
Dept: ORTHOPEDIC SURGERY | Facility: CLINIC | Age: 84
End: 2024-07-11